# Patient Record
Sex: MALE | Race: WHITE | Employment: FULL TIME | URBAN - METROPOLITAN AREA
[De-identification: names, ages, dates, MRNs, and addresses within clinical notes are randomized per-mention and may not be internally consistent; named-entity substitution may affect disease eponyms.]

---

## 2023-06-19 ENCOUNTER — TRANSFERRED RECORDS (OUTPATIENT)
Dept: HEALTH INFORMATION MANAGEMENT | Facility: CLINIC | Age: 38
End: 2023-06-19

## 2023-08-11 ENCOUNTER — TRANSFERRED RECORDS (OUTPATIENT)
Dept: HEALTH INFORMATION MANAGEMENT | Facility: CLINIC | Age: 38
End: 2023-08-11

## 2023-09-26 ENCOUNTER — TELEPHONE (OUTPATIENT)
Dept: NEUROSURGERY | Facility: CLINIC | Age: 38
End: 2023-09-26

## 2023-09-26 ENCOUNTER — TRANSCRIBE ORDERS (OUTPATIENT)
Dept: OTHER | Age: 38
End: 2023-09-26

## 2023-09-26 DIAGNOSIS — D49.7 INTRADURAL TUMOR: Primary | ICD-10-CM

## 2023-09-26 NOTE — TELEPHONE ENCOUNTER
CE accessed. Fax request sent to [place] and [facility] for images

## 2023-09-26 NOTE — TELEPHONE ENCOUNTER
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

## 2023-10-16 NOTE — TELEPHONE ENCOUNTER
Action 10/16/23 MV 12.32pm   Action Taken Records request faxed to [facility]     Action 10/25/23 MV 10.02am   Action Taken Records received and sent to scanning         SPINE PATIENTS - NEW PROTOCOL PREVISIT    RECORDS RECEIVED FROM: external   REASON FOR VISIT: intradural tumor    Date of Appt: 11/3/23   NOTES (FOR ALL VISITS) STATUS DETAILS   OFFICE NOTE from referring provider Received Dr Jabari Arevalo @ [facility]:  8/11/23   OFFICE NOTE from other specialist Care Everywhere Dr Jayleen Montano @ [facility]:  9/12/23    Dr Eric Martinez @ [facility]:  9/11/23   MEDICATION LIST Care Everywhere    IMAGING  (FOR ALL VISITS)     MRI (HEAD, NECK, SPINE) PACS [facility]:  MRI Brain 9/29/23  MRI Cervical Spine 9/29/23  MRI Thoracic Spine 9/29/23  MRI Lumbar Spine 6/19/23    [place]:  MRI Lumbar Spine 9/13/23

## 2023-10-22 ENCOUNTER — HEALTH MAINTENANCE LETTER (OUTPATIENT)
Age: 38
End: 2023-10-22

## 2023-10-31 ENCOUNTER — TELEPHONE (OUTPATIENT)
Dept: NEUROSURGERY | Facility: CLINIC | Age: 38
End: 2023-10-31
Payer: COMMERCIAL

## 2023-10-31 NOTE — TELEPHONE ENCOUNTER
[facility] Call Center    Phone Message    May a detailed message be left on voicemail: yes     Reason for Call: Other: Patient is calling regarding questions he has about his appointment  Friday before driving down since he lives many hours away. Please call back when available.      Action Taken: Other: Neurosurgery    Travel Screening: Not Applicable

## 2023-11-03 ENCOUNTER — PRE VISIT (OUTPATIENT)
Dept: NEUROSURGERY | Facility: CLINIC | Age: 38
End: 2023-11-03